# Patient Record
(demographics unavailable — no encounter records)

---

## 2024-11-08 NOTE — REVIEW OF SYSTEMS
[Shortness Of Breath] : shortness of breath [Wheezing] : wheezing [Cough] : cough [Negative] : Heme/Lymph [Dyspnea on Exertion] : not dyspnea on exertion

## 2024-11-08 NOTE — ASSESSMENT
[FreeTextEntry1] : completed physical exam, blood work and urinalysis ordered today, will follow up accordingly. HCM: up to date complaints of sob, cough, smoker, hx of asthma, xray chest, referral to pulmonary renewed albuterol today vitals, ecg stable vaccines; declined

## 2024-11-08 NOTE — HEALTH RISK ASSESSMENT
[Fair] : ~his/her~ current health as fair  [Good] : ~his/her~  mood as  good [Yes] : Yes [2 - 3 times a week (3 pts)] : 2 - 3  times a week (3 points) [3 or 4 (1 pt)] : 3 or 4  (1 point) [Never (0 pts)] : Never (0 points) [No] : In the past 12 months have you used drugs other than those required for medical reasons? No [No falls in past year] : Patient reported no falls in the past year [0] : 2) Feeling down, depressed, or hopeless: Not at all (0) [PHQ-2 Negative - No further assessment needed] : PHQ-2 Negative - No further assessment needed [HIV Test offered] : HIV Test offered [Hepatitis C test offered] : Hepatitis C test offered [None] : None [With Family] : lives with family [Employed] : employed [College] : College [] :  [# Of Children ___] : has [unfilled] children [Sexually Active] : sexually active [Feels Safe at Home] : Feels safe at home [Fully functional (bathing, dressing, toileting, transferring, walking, feeding)] : Fully functional (bathing, dressing, toileting, transferring, walking, feeding) [Fully functional (using the telephone, shopping, preparing meals, housekeeping, doing laundry, using] : Fully functional and needs no help or supervision to perform IADLs (using the telephone, shopping, preparing meals, housekeeping, doing laundry, using transportation, managing medications and managing finances) [Smoke Detector] : smoke detector [Carbon Monoxide Detector] : carbon monoxide detector [Safety elements used in home] : safety elements used in home [Seat Belt] :  uses seat belt [Sunscreen] : uses sunscreen [With Patient/Caregiver] : , with patient/caregiver [Reviewed no changes] : Reviewed, no changes [Name: ___] : Health Care Proxy's Name: [unfilled]  [Relationship: ___] : Relationship: [unfilled] [Aggressive treatment] : aggressive treatment [Current] : Current [10-14] : 10-14 [NO] : No [de-identified] : social  [Audit-CScore] : 4 [[id number]] : 0 [Change in mental status noted] : No change in mental status noted [Language] : denies difficulty with language [Behavior] : denies difficulty with behavior [Learning/Retaining New Information] : denies difficulty learning/retaining new information [Handling Complex Tasks] : denies difficulty handling complex tasks [Reasoning] : denies difficulty with reasoning [Spatial Ability and Orientation] : denies difficulty with spatial ability and orientation [High Risk Behavior] : no high risk behavior [Reports changes in hearing] : Reports no changes in hearing [Reports changes in vision] : Reports no changes in vision [Reports changes in dental health] : Reports no changes in dental health [de-identified] : dentist twice a year [AdvancecareDate] : 11/08/2024 [de-identified] : hookah smoker

## 2024-11-08 NOTE — HISTORY OF PRESENT ILLNESS
[FreeTextEntry1] : establish care, annual exam, health concern [de-identified] : Mr. Alan Bates is a 42 yo male presents today to establish care and for annual comprehensive/labs. Pt does reports hx of smoking hookah, currently curtailing it down. With history of asthma also family hx of CAD, coronary bypass and also symptoms of occasional sob concerned for his lung health. Advised today to cut down smoking hookah completely, utilize the albuterol as needed, and will get CXR to evaluation and referral to pulmonary. Today, denies any fever, chills, n/v/c/d.

## 2024-11-08 NOTE — PHYSICAL EXAM
[No Acute Distress] : no acute distress [Well Nourished] : well nourished [EOMI] : extraocular movements intact [Supple] : supple [No Edema] : there was no peripheral edema [Soft] : abdomen soft [Non Tender] : non-tender [No CVA Tenderness] : no CVA  tenderness [No Spinal Tenderness] : no spinal tenderness [No Rash] : no rash [Normal Gait] : normal gait [Normal Affect] : the affect was normal

## 2025-03-17 NOTE — REVIEW OF SYSTEMS
[Fatigue] : fatigue [Dizziness] : dizziness [Negative] : Heme/Lymph [Shortness Of Breath] : no shortness of breath [Wheezing] : no wheezing [Cough] : no cough [Dyspnea on Exertion] : not dyspnea on exertion [Headache] : no headache [Fainting] : no fainting [Confusion] : no confusion [Unsteady Walk] : no ataxia [Memory Loss] : no memory loss [de-identified] : estuardo

## 2025-03-17 NOTE — HISTORY OF PRESENT ILLNESS
[FreeTextEntry8] : Mr. Alan Bates is a 42 yo male presents today for sensation of headache, dizziness, jitteriness, over the last few weeks. Pt concerned his blood glucose as well since he was found slight prediabetic last visit. Pt does reports now also continuing with statin therapy, though admits misses to take some doses. Pt advised today will get comprehensive fasting labs and evaluate. Otherwise, denies any fever, chills, n/v/c/d. Pt also reports recently recovered from covid-19.